# Patient Record
Sex: MALE | Race: WHITE | NOT HISPANIC OR LATINO | URBAN - METROPOLITAN AREA
[De-identification: names, ages, dates, MRNs, and addresses within clinical notes are randomized per-mention and may not be internally consistent; named-entity substitution may affect disease eponyms.]

---

## 2019-07-06 ENCOUNTER — EMERGENCY (EMERGENCY)
Facility: HOSPITAL | Age: 19
LOS: 0 days | Discharge: HOME | End: 2019-07-06
Attending: EMERGENCY MEDICINE | Admitting: EMERGENCY MEDICINE
Payer: COMMERCIAL

## 2019-07-06 VITALS
HEART RATE: 60 BPM | DIASTOLIC BLOOD PRESSURE: 71 MMHG | SYSTOLIC BLOOD PRESSURE: 135 MMHG | OXYGEN SATURATION: 100 % | RESPIRATION RATE: 18 BRPM | TEMPERATURE: 98 F

## 2019-07-06 VITALS — HEIGHT: 71 IN | WEIGHT: 195.11 LBS

## 2019-07-06 DIAGNOSIS — R21 RASH AND OTHER NONSPECIFIC SKIN ERUPTION: ICD-10-CM

## 2019-07-06 DIAGNOSIS — L30.9 DERMATITIS, UNSPECIFIED: ICD-10-CM

## 2019-07-06 PROCEDURE — 99282 EMERGENCY DEPT VISIT SF MDM: CPT

## 2019-07-06 RX ORDER — VALACYCLOVIR 500 MG/1
1000 TABLET, FILM COATED ORAL ONCE
Refills: 0 | Status: COMPLETED | OUTPATIENT
Start: 2019-07-06 | End: 2019-07-06

## 2019-07-06 RX ORDER — CEPHALEXIN 500 MG
1 CAPSULE ORAL
Qty: 40 | Refills: 0
Start: 2019-07-06

## 2019-07-06 RX ORDER — VALACYCLOVIR 500 MG/1
1 TABLET, FILM COATED ORAL
Qty: 21 | Refills: 0
Start: 2019-07-06

## 2019-07-06 RX ORDER — CEPHALEXIN 500 MG
500 CAPSULE ORAL ONCE
Refills: 0 | Status: COMPLETED | OUTPATIENT
Start: 2019-07-06 | End: 2019-07-06

## 2019-07-06 RX ADMIN — VALACYCLOVIR 1000 MILLIGRAM(S): 500 TABLET, FILM COATED ORAL at 20:54

## 2019-07-06 RX ADMIN — Medication 500 MILLIGRAM(S): at 20:25

## 2019-07-06 RX ADMIN — Medication 1 APPLICATION(S): at 20:25

## 2019-07-06 NOTE — ED STATDOCS - SKIN, MLM
skin normal color for race, warm, dry and intact. skin normal color for race, warm, dry and intact. +erythematous 3mm diameter papules, some have ruptured.

## 2019-07-06 NOTE — ED STATDOCS - PROGRESS NOTE DETAILS
will treat with Keflex as well as Valtrex as per Dr. Hatch. Dr. Hatch ordered Kenalog cream for topical tx. ED evaluation, Diagnosis and management discussed with the patient and family in detail. discussed with ED attending, OK to dc home.  Close PMD follow up encouraged.  Strict ED return instructions discussed in detail and patient given the opportunity to ask any questions about their discharge diagnosis and instructions. Patient verbalized understanding. ~ LUKE Randle

## 2019-07-06 NOTE — ED STATDOCS - OBJECTIVE STATEMENT
20 y/o male with a PMHx of  presents to the ED c/o 18 y/o male with no pertinent PMHx presents to the ED c/o worsening rash x4 days. Pt states 3 days ago he developed a pimple like rash, states there were about 2-3 dots. Next day went to the beach and after noticed it had worsened. States it has spread around his armpit, became red and with drainage. States he has some irritation in the area. The drainage has been mostly clear. Today put on an ointment similar to Cortizone without relief. Denies fever, itch. Pharamcy: [facility].

## 2019-07-06 NOTE — ED STATDOCS - PHYSICAL EXAMINATION
GEN: AOX3, NAD. HEENT: Throat clear. Airway is patent. EYES: PERRLA. EOMI. Head: NC/AT. NECK: Supple, No JVD. FROM. C-spine non-tender. CV:S1S2, RRR, LUNGS: CTA b/l, no w/r/r. CHEST: Equal chest expansion and rise. No deformity. ABD: Soft, NT/ND, no rebound, no guarding. No CVAT. EXT: No e/c/c. 2+ distal pulses. SKIN: +Erythematous clusters-like non specific rash noted right axilla. Mildly pruritic.  NEURO: No focal deficits. CN II-XII intact. FROM. 5/5 motor and sensory. LUKE Randle

## 2019-07-06 NOTE — ED STATDOCS - ATTENDING CONTRIBUTION TO CARE
I, Jacoby Archuleta, performed the initial face to face bedside interview with this patient regarding history of present illness, review of symptoms and relevant past medical, social and family history.  I completed an independent physical examination.  I was the initial provider who evaluated this patient. I have signed out the follow up of any pending tests (i.e. labs, radiological studies) to the ACP.  I have communicated the patient’s plan of care and disposition with the ACP.  The history, relevant review of systems, past medical and surgical history, medical decision making, and physical examination was documented by the scribe in my presence and I attest to the accuracy of the documentation.